# Patient Record
Sex: FEMALE
[De-identification: names, ages, dates, MRNs, and addresses within clinical notes are randomized per-mention and may not be internally consistent; named-entity substitution may affect disease eponyms.]

---

## 2020-10-05 PROBLEM — Z00.00 ENCOUNTER FOR PREVENTIVE HEALTH EXAMINATION: Status: ACTIVE | Noted: 2020-10-05

## 2020-10-07 ENCOUNTER — APPOINTMENT (OUTPATIENT)
Dept: NEUROLOGY | Facility: CLINIC | Age: 30
End: 2020-10-07
Payer: COMMERCIAL

## 2020-10-07 ENCOUNTER — TRANSCRIPTION ENCOUNTER (OUTPATIENT)
Age: 30
End: 2020-10-07

## 2020-10-07 VITALS
SYSTOLIC BLOOD PRESSURE: 108 MMHG | HEART RATE: 82 BPM | WEIGHT: 117 LBS | BODY MASS INDEX: 20.73 KG/M2 | HEIGHT: 63 IN | DIASTOLIC BLOOD PRESSURE: 73 MMHG

## 2020-10-07 VITALS — TEMPERATURE: 97.1 F

## 2020-10-07 DIAGNOSIS — Z87.39 PERSONAL HISTORY OF OTHER DISEASES OF THE MUSCULOSKELETAL SYSTEM AND CONNECTIVE TISSUE: ICD-10-CM

## 2020-10-07 DIAGNOSIS — Z82.0 FAMILY HISTORY OF EPILEPSY AND OTHER DISEASES OF THE NERVOUS SYSTEM: ICD-10-CM

## 2020-10-07 PROCEDURE — 99204 OFFICE O/P NEW MOD 45 MIN: CPT

## 2020-10-07 RX ORDER — GALCANEZUMAB 120 MG/ML
120 INJECTION, SOLUTION SUBCUTANEOUS
Qty: 3 | Refills: 3 | Status: COMPLETED | COMMUNITY

## 2020-10-07 NOTE — ASSESSMENT
[FreeTextEntry1] : Assessment/Plan:\par  30 year female with history of Migraines with aura since 2014. She recently moved to [place], [place] from [place], where she was followed by Dr. Tariq Chávez. She has been on Emgality since Feb 2020 and reports significant improvement in migraine frequency, from 10 migraine days/month to 2 migraines days/month. Patient tolerating the medication well and denies any side effects.\par \par [] Continue Emgality 120 mg monthly subcutaneous injections (will refill)\par Discussed potential adverse reactions; including injections site reactions, Anaphylaxis, angioedema. \par \par \par Headache education provided:\par [] Stay well hydrated\par [] Limit excessive caffeine and alcohol intake\par [] Maintain good sleep hygiene\par [] Try to avoid triggers\par [] Practice good eating habits\par [] Avoid excessive use of over the counter pain medications, as they can cause medication overuse headaches \par [] Keep a headache diary\par \par \par RTC in 3 months\par \par \par MASHA EBER was provided education and counselling on current diagnosis/symptoms, diagnostic work up, treatment options and potential side effects of prescribed therapy/therapies. She was advised to call our clinic at [phone] for any new or worsening symptoms, or with any questions or concerns. MASHA EBER expressed understanding and all her questions were answered.\par

## 2020-10-07 NOTE — PHYSICAL EXAM
[FreeTextEntry1] : PHYSICAL EXAM\par Constitutional: Alert, no acute distress \par Neck: no masses, full range of motion\par Psychiatric: appropriate affect and mood\par Pulmonary: No respiratory distress, stable on room air\par Cardiac: Normal rate/rhythm\par Abdomen: soft, non-distended\par Skin: No rash, no skin lesions\par NEUROLOGICAL EXAM\par Mental status: The patient is alert, attentive, and oriented.\par Speech: clear and fluent with good repetition, comprehension, and naming\par Cranial nerves:\par CN II: Visual fields are full to confrontation. Pupil size equal and briskly reactive to light. \par CN III, IV, VI: EOMI, no nystagmus, no ptosis\par CN V: Facial sensation is intact to pinprick in all 3 divisions bilaterally.\par CN VII: Face is symmetric with normal eye closure and smile.\par CN VII: Hearing is normal to rubbing fingers\par CN IX, X: Palate elevates symmetrically. Phonation is normal.\par CN XI: Head turning and shoulder shrug are intact\par CN XII: Tongue is midline with normal movements and no atrophy.\par Motor: There is no pronator drift of out-stretched arms. Muscle bulk and tone are normal. Strength is full bilaterally. \par 5/5 muscle power at bilat: Deltoid, Biceps, Triceps, Wrist ext, Finger abd, Hip flex, Hip ext, Knee flex, Knee ext, Ankle flex, Ankle ext\par Reflexes: Reflexes are 2+ and symmetric at the biceps, triceps, knees, and ankles. Plantar responses are flexor.\par Sensory: Light touch, pinprick, position sense, and vibration sense are intact in fingers and toes.\par Coordination:\par Rapid alternating movements and fine finger movements are intact. There is no dysmetria on finger-to-nose and heel-knee-shin. There are no abnormal or extraneous movements. Romberg is absent.\par Gait/Stance:\par Posture is normal. Gait is steady with normal steps, base, arm swing, and turning. Heel and toe walking are normal. Tandem gait is normal \par \par \par \par \par

## 2020-10-31 ENCOUNTER — RESULT REVIEW (OUTPATIENT)
Age: 30
End: 2020-10-31

## 2021-01-12 ENCOUNTER — APPOINTMENT (OUTPATIENT)
Dept: NEUROLOGY | Facility: CLINIC | Age: 31
End: 2021-01-12
Payer: COMMERCIAL

## 2021-01-12 PROCEDURE — 99213 OFFICE O/P EST LOW 20 MIN: CPT | Mod: 95

## 2021-01-12 NOTE — REASON FOR VISIT
[Home] : at home, [unfilled] , at the time of the visit. [Other Location: e.g. Home (Enter Location, City,State)___] : at [unfilled] [Follow-Up: _____] : a [unfilled] follow-up visit

## 2021-01-12 NOTE — HISTORY OF PRESENT ILLNESS
[FreeTextEntry1] : Interim Hx 1/12/2021:- She continues to do well. She reports 1 migraine headache since our last visit. She said she had a migraine headache in December, and adds that they had been out driving for most of the day and that likely triggered her headache. She continues on emgality and denies any side effects. She adds that she has had 2 nose bleeds in the last few months and is unsure if this is related to her medication. She reports she has no plans to conceive at this time and will plan to wait another 2 years. \par \par \par HPI (initial visit 10/7/2020):-\par Annmarie has a hx of migraines and is currently on Emgality since Feb 2020. She was following with Dr. Tariq Chávez in [place] (her last injection was September 20th, 2020). She reports significant reduction in migraine frequency since starting the medication. \par \par \par  - - Headache History - - \par Onset (age/year): in 2014, in last semester of college\par Location/radiation: back of the head, mid temporal area, forehead\par Quality: throbbing and pressure\par Duration: 1 day\par Frequency: 1-2 /month. \par                     prior to emgality - 10 migraine days/month\par Aura: visual - floaters (occasionallY)\par            Motor - no\par            Sensory - no\par            Other-x\par Associated symptoms: YES Photophobia, YES Phonophobia, YES nausea, vomiting (yes in the past), NO dizziness, NO autonomic symptoms, YES menstrual cycle (In July 2020 she was switched from a combination to OCP to a progesterone only OCP), NO neck tightness, NO decreased concentration, NO Floaters, NO blurred vision\par Relieving factors: sleep\par Triggers/Aggravating factors: certain smells, lack of sleep, no much sleep\par Caffeine intake: 1 cup of coffee a day\par Current treatment\par                     abortive: none\par                     preventative: no orals, Emgality\par Medications tried/failed: sumatriptan, Rizatriptan ("not effective")\par Imaging studies: MRI brain in [place] (a few years ago- ~ 2015)- patient believes it was normal\par TMJ: no issues\par

## 2021-01-12 NOTE — PHYSICAL EXAM
[FreeTextEntry1] : PHYSICAL EXAM\par Constitutional: Alert, no acute distress \par Neck: full range of motion\par Psychiatric: appropriate affect and mood\par Pulmonary: No respiratory distress, stable on room air\par \par NEUROLOGICAL EXAM\par Mental status: The patient is alert, attentive, and oriented.\par Speech: clear and fluent \par Cranial nerves:\par CN III, IV, VI: EOMI, no nystagmus, no ptosis\par CN VII: Face is symmetric with normal eye closure and smile.\par CN XII: Tongue is midline with normal movements and no atrophy.\par Motor: There is no pronator drift of out-stretched arms.\par Coordination:\par Rapid alternating movements and fine finger movements are intact. There is no dysmetria on finger-to-nose. There are no abnormal or extraneous movements. \par Gait/Stance: not assessed\par \par

## 2021-01-12 NOTE — ASSESSMENT
[FreeTextEntry1] : Assessment/Plan:\par  30 year female with history of Migraines with aura since  and currently well controlled on Emgality. She has been on Emgality since 2020 and reports significant improvement in migraine frequency, from 10 migraine days/month to 1-2 migraines days/month. Patient tolerating the medication well and denies any side effects.\par \par [] Continue Emgality 120 mg monthly subcutaneous injections (will refill)\par Discussed potential adverse reactions; including injections site reactions, Anaphylaxis, angioedema. \par Will continue to monitor nose bleeds and if she continues to have these symptoms will discuss with drug company.\par Masha reports no plans to conceive at this time. I have discussed with her that it is not recommended to be on Emgality during pregnancy or with plans to conceive and would recommend at least a 5 month wash out period prior to becoming pregnant.\par \par [] Continue Ubrelvy as needed for migraine  (pt reports not having to take this medication)\par \par \par Headache education provided:\par [] Stay well hydrated\par [] Limit excessive caffeine and alcohol intake\par [] Maintain good sleep hygiene\par [] Try to avoid triggers\par [] Practice good eating habits\par [] Avoid excessive use of over the counter pain medications, as they can cause medication overuse headaches \par [] Keep a headache diary\par \par \par RTC in 6 months, or sooner if needed\par \par \par MASHA VELÁZQUEZ was provided education and counselling on current diagnosis/symptoms, diagnostic work up, treatment options and potential side effects of prescribed therapy/therapies. She was advised to call our clinic at [phone] for any new or worsening symptoms, or with any questions or concerns. MASHA VELÁZQUEZ expressed understanding and all her questions were answered.\par

## 2021-02-06 ENCOUNTER — OUTPATIENT (OUTPATIENT)
Dept: OUTPATIENT SERVICES | Facility: HOSPITAL | Age: 31
LOS: 1 days | End: 2021-02-06
Payer: COMMERCIAL

## 2021-02-06 DIAGNOSIS — Z20.828 CONTACT WITH AND (SUSPECTED) EXPOSURE TO OTHER VIRAL COMMUNICABLE DISEASES: ICD-10-CM

## 2021-02-06 LAB — SARS-COV-2 RNA SPEC QL NAA+PROBE: SIGNIFICANT CHANGE UP

## 2021-02-06 PROCEDURE — U0003: CPT

## 2021-02-06 PROCEDURE — C9803: CPT

## 2021-02-06 PROCEDURE — U0005: CPT

## 2021-02-07 DIAGNOSIS — Z20.828 CONTACT WITH AND (SUSPECTED) EXPOSURE TO OTHER VIRAL COMMUNICABLE DISEASES: ICD-10-CM

## 2021-03-23 ENCOUNTER — TRANSCRIPTION ENCOUNTER (OUTPATIENT)
Age: 31
End: 2021-03-23

## 2021-04-02 ENCOUNTER — TRANSCRIPTION ENCOUNTER (OUTPATIENT)
Age: 31
End: 2021-04-02

## 2021-04-02 ENCOUNTER — NON-APPOINTMENT (OUTPATIENT)
Age: 31
End: 2021-04-02

## 2021-04-29 ENCOUNTER — TRANSCRIPTION ENCOUNTER (OUTPATIENT)
Age: 31
End: 2021-04-29

## 2021-05-05 ENCOUNTER — TRANSCRIPTION ENCOUNTER (OUTPATIENT)
Age: 31
End: 2021-05-05

## 2021-05-11 ENCOUNTER — APPOINTMENT (OUTPATIENT)
Dept: NEUROLOGY | Facility: CLINIC | Age: 31
End: 2021-05-11

## 2021-05-11 ENCOUNTER — NON-APPOINTMENT (OUTPATIENT)
Age: 31
End: 2021-05-11

## 2021-05-19 ENCOUNTER — TRANSCRIPTION ENCOUNTER (OUTPATIENT)
Age: 31
End: 2021-05-19

## 2021-09-15 ENCOUNTER — RX RENEWAL (OUTPATIENT)
Age: 31
End: 2021-09-15

## 2021-09-16 ENCOUNTER — TRANSCRIPTION ENCOUNTER (OUTPATIENT)
Age: 31
End: 2021-09-16

## 2021-12-06 ENCOUNTER — TRANSCRIPTION ENCOUNTER (OUTPATIENT)
Age: 31
End: 2021-12-06

## 2021-12-16 ENCOUNTER — TRANSCRIPTION ENCOUNTER (OUTPATIENT)
Age: 31
End: 2021-12-16

## 2022-02-16 ENCOUNTER — TRANSCRIPTION ENCOUNTER (OUTPATIENT)
Age: 32
End: 2022-02-16

## 2022-02-24 ENCOUNTER — TRANSCRIPTION ENCOUNTER (OUTPATIENT)
Age: 32
End: 2022-02-24

## 2022-03-01 ENCOUNTER — APPOINTMENT (OUTPATIENT)
Dept: NEUROLOGY | Facility: CLINIC | Age: 32
End: 2022-03-01
Payer: COMMERCIAL

## 2022-03-01 DIAGNOSIS — L65.9 NONSCARRING HAIR LOSS, UNSPECIFIED: ICD-10-CM

## 2022-03-01 PROCEDURE — 99213 OFFICE O/P EST LOW 20 MIN: CPT | Mod: 95

## 2022-03-01 RX ORDER — PROMETHAZINE HYDROCHLORIDE 25 MG/1
25 TABLET ORAL EVERY 8 HOURS
Qty: 15 | Refills: 3 | Status: DISCONTINUED | COMMUNITY
Start: 2021-05-11 | End: 2022-03-01

## 2022-03-01 RX ORDER — METHYLPREDNISOLONE 4 MG/1
4 TABLET ORAL
Qty: 1 | Refills: 0 | Status: DISCONTINUED | COMMUNITY
Start: 2021-04-02 | End: 2022-03-01

## 2022-03-01 NOTE — HISTORY OF PRESENT ILLNESS
[FreeTextEntry1] : INTERIM HX 03/01/2022: She continues on emgality. She got COVID in Dec 2021. Taste not fully back. Headaches got worse during COVID. Freq: 1/month, not as severe. She has not gotten the booster yet. Noticing hair loss, recently. \par \par Interim Hx 1/12/2021:- She continues to do well. She reports 1 migraine headache since our last visit. She said she had a migraine headache in December, and adds that they had been out driving for most of the day and that likely triggered her headache. She continues on emgality and denies any side effects. She adds that she has had 2 nose bleeds in the last few months and is unsure if this is related to her medication. She reports she has no plans to conceive at this time and will plan to wait another 2 years. \par \par \par HPI (initial visit 10/7/2020):-\par Annmarie has a hx of migraines and is currently on Emgality since Feb 2020. She was following with Dr. Tariq Chávez in [place] (her last injection was September 20th, 2020). She reports significant reduction in migraine frequency since starting the medication. \par \par \par  - - Headache History - - \par Onset (age/year): in 2014, in last semester of college\par Location/radiation: back of the head, mid temporal area, forehead\par Quality: throbbing and pressure\par Duration: 1 day\par Frequency: 1-2 /month. \par                     prior to emgality - 10 migraine days/month\par Aura: visual - floaters (occasionallY)\par            Motor - no\par            Sensory - no\par            Other-x\par Associated symptoms: YES Photophobia, YES Phonophobia, YES nausea, vomiting (yes in the past), NO dizziness, NO autonomic symptoms, YES menstrual cycle (In July 2020 she was switched from a combination to OCP to a progesterone only OCP), NO neck tightness, NO decreased concentration, NO Floaters, NO blurred vision\par Relieving factors: sleep\par Triggers/Aggravating factors: certain smells, lack of sleep, no much sleep\par Caffeine intake: 1 cup of coffee a day\par Current treatment\par                     abortive: none\par                     preventative: no orals, Emgality\par Medications tried/failed: sumatriptan, Rizatriptan ("not effective")\par Imaging studies: MRI brain in [place] (a few years ago- ~ 2015)- patient believes it was normal\par TMJ: no issues\par

## 2022-03-01 NOTE — ASSESSMENT
[FreeTextEntry1] : Assessment/Plan:\par 1. Migraines with aura-  currently well controlled on Emgality. She has been on Emgality since 2020 and reports significant improvement in migraine frequency, from 10 migraine days/month to 1-2 migraines days/month. Patient tolerating the medication well and denies any side effects.\par Plan:-\par [] Continue Emgality 120 mg monthly subcutaneous injections (will refill)\par Discussed potential adverse reactions; including injections site reactions, Anaphylaxis, angioedema. \par Masha reports no plans to conceive at this time. I have discussed with her that it is not recommended to be on Emgality during pregnancy or with plans to conceive and would recommend at least a 5 month wash out period prior to becoming pregnant.\par [] Continue Ubrelvy as needed for migraine  (pt reports not having to take this medication)\par \par \par Headache education provided:\par [] Stay well hydrated\par [] Limit excessive caffeine and alcohol intake\par [] Maintain good sleep hygiene\par [] Try to avoid triggers\par [] Practice good eating habits\par [] Avoid excessive use of over the counter pain medications, as they can cause medication overuse headaches \par [] Keep a headache diary\par \par 2. Hair loss- this is likely multifactorial. Likely related hair fall with age that is exacerbated by recent COVID-19 infection. Recommend multivitamin supplementation. \par \par \par RTC in 6 months, or sooner if needed\par \par \par The above plan was discussed with MASHA VELÁZQUEZ in great detail.  MASHA VELÁZQUEZ verbalized understanding and agrees with plan as detailed above. Patient was provided education and counselling on current diagnosis/symptoms, diagnostic work up, treatment options and potential side effects of any prescribed therapy/therapies. She was advised to call our clinic at [phone] for any new or worsening symptoms, or with any questions or concerns. In case of acute onset of neurological symptoms or worsening presentation, patient was advised to present to nearest emergency room for further evaluation. MASHA VELÁZQUEZ expressed understanding and all her questions/concerns were addressed.\par \par Karen Vega M.D\par

## 2022-03-01 NOTE — PHYSICAL EXAM
[FreeTextEntry1] : sitting in her [employer] car. Speech is clear and fluent. Comprehension intact. NO facial asymmetry noted. No definite ptosis. In good mood.

## 2022-03-01 NOTE — REASON FOR VISIT
[Follow-Up: _____] : a [unfilled] follow-up visit [Medical Office: ([facility])___] : at the medical office located in  [Verbal consent obtained from patient] : the patient, [unfilled] [Other Location: e.g. School (Enter Location, City,State)___] : at [unfilled], at the time of the visit.

## 2022-08-29 ENCOUNTER — APPOINTMENT (OUTPATIENT)
Dept: NEUROLOGY | Facility: CLINIC | Age: 32
End: 2022-08-29

## 2022-08-29 PROCEDURE — 99212 OFFICE O/P EST SF 10 MIN: CPT | Mod: 95

## 2022-08-29 RX ORDER — CLOTRIMAZOLE AND BETAMETHASONE DIPROPIONATE 10; .5 MG/G; MG/G
1-0.05 CREAM TOPICAL
Qty: 30 | Refills: 0 | Status: DISCONTINUED | COMMUNITY
Start: 2022-04-28

## 2022-08-29 NOTE — HISTORY OF PRESENT ILLNESS
[FreeTextEntry1] : INTERIM HX 08/29/2022: Doing very well. No migraine since since the fall. On Emgality. No plans to conceive yet. \par \par INTERIM HX 03/01/2022: She continues on emgality. She got COVID in Dec 2021.  Taste not fully back. Headaches got worse during COVID. Freq: 1/month, not as severe. She has not gotten the booster yet. Noticing hair loss, recently. \par \par \par Interim Hx 1/12/2021:- She continues to do well. She reports 1 migraine headache since our last visit. She said she had a migraine headache in December, and adds that they had been out driving for most of the day and that likely triggered her headache. She continues on emgality and denies any side effects. She adds that she has had 2 nose bleeds in the last few months and is unsure if this is related to her medication. She reports she has no plans to conceive at this time and will plan to wait another 2 years. \par \par \par HPI (initial visit 10/7/2020):-\par Annmarie has a hx of migraines and is currently on Emgality since Feb 2020. She was following with Dr. Tariq Chávez in [place] (her last injection was September 20th, 2020). She reports significant reduction in migraine frequency since starting the medication. \par \par \par  - - Headache History - - \par Onset (age/year): in 2014, in last semester of college\par Location/radiation: back of the head, mid temporal area, forehead\par Quality: throbbing and pressure\par Duration: 1 day\par Frequency: 1-2 /month. \par                     prior to emgality - 10 migraine days/month\par Aura: visual - floaters (occasionallY)\par            Motor - no\par            Sensory - no\par            Other-x\par Associated symptoms: YES Photophobia, YES Phonophobia, YES nausea, vomiting (yes in the past), NO dizziness, NO autonomic symptoms, YES menstrual cycle (In July 2020 she was switched from a combination to OCP to a progesterone only OCP), NO neck tightness, NO decreased concentration, NO Floaters, NO blurred vision\par Relieving factors: sleep\par Triggers/Aggravating factors: certain smells, lack of sleep, no much sleep\par Caffeine intake: 1 cup of coffee a day\par Current treatment\par                     abortive: none\par                     preventative: no orals, Emgality\par Medications tried/failed: sumatriptan, Rizatriptan ("not effective")\par Imaging studies: MRI brain in [place] (a few years ago- ~ 2015)- patient believes it was normal\par TMJ: no issues\par

## 2022-08-29 NOTE — PHYSICAL EXAM
[FreeTextEntry1] : Sitting in her desk (at home). Speech is clear and fluent. Comprehension intact. No facial asymmetry noted. No definite ptosis. In good mood.

## 2022-08-29 NOTE — REASON FOR VISIT
[Home] : at home, [unfilled] , at the time of the visit. [Medical Office: ([facility])___] : at the medical office located in  [Patient] : the patient [Follow-Up: _____] : a [unfilled] follow-up visit

## 2022-08-29 NOTE — ASSESSMENT
[FreeTextEntry1] : Assessment/Plan:\par Migraines with aura-  currently well controlled on Emgality. She has been on Emgality since Feb 2020 and reports significant improvement in migraine frequency, from 10 migraine days/month to now experiencing sporadic headaches. Patient tolerating the medication well and denies any side effects.\par \par Plan:-\par [] Continue Emgality 120 mg monthly subcutaneous injections (will refill)\par Discussed potential adverse reactions; including injections site reactions, Anaphylaxis, angioedema. \par Masha reports no plans to conceive at this time. I have discussed with her that it is not recommended to be on Emgality during pregnancy or with plans to conceive and would recommend at least a 5 month wash out period prior to becoming pregnant.\par \par \par Headache education provided:\par [] Stay well hydrated\par [] Limit excessive caffeine and alcohol intake\par [] Maintain good sleep hygiene. Follow a consistent sleep and wake schedule. \par [] Practice good eating habits. Avoid skipping meals. \par [] Try to avoid any known triggers\par [] Avoid excessive use of over the counter pain medications, as they can cause medication overuse headaches \par [] Keep a headache diary\par [] Relaxation techniques, biofeedback, massage therapy, acupunctures, and heating pads may be effective\par \par \par RTC in 6 months, or sooner if needed\par \par The above plan was discussed with MASHA EBER in great detail.  MASHA CRUZZVI verbalized understanding and agrees with plan as detailed above. She was advised to call our clinic at [phone] for any new or worsening symptoms, or with any questions or concerns. In case of acute onset of neurological symptoms or worsening presentation, patient was advised to present to nearest emergency room for further evaluation. MASHA EBER expressed understanding and all her questions/concerns were addressed.\par \par Karen Vega M.D\par

## 2022-11-30 ENCOUNTER — TRANSCRIPTION ENCOUNTER (OUTPATIENT)
Age: 32
End: 2022-11-30

## 2022-12-28 ENCOUNTER — TRANSCRIPTION ENCOUNTER (OUTPATIENT)
Age: 32
End: 2022-12-28

## 2023-02-01 ENCOUNTER — APPOINTMENT (OUTPATIENT)
Dept: NEUROLOGY | Facility: CLINIC | Age: 33
End: 2023-02-01

## 2023-02-03 ENCOUNTER — APPOINTMENT (OUTPATIENT)
Dept: NEUROLOGY | Facility: CLINIC | Age: 33
End: 2023-02-03
Payer: COMMERCIAL

## 2023-02-03 VITALS
DIASTOLIC BLOOD PRESSURE: 68 MMHG | BODY MASS INDEX: 19.67 KG/M2 | WEIGHT: 111 LBS | HEIGHT: 63 IN | SYSTOLIC BLOOD PRESSURE: 109 MMHG | HEART RATE: 79 BPM

## 2023-02-03 DIAGNOSIS — G43.109 MIGRAINE WITH AURA, NOT INTRACTABLE, W/OUT STATUS MIGRAINOSUS: ICD-10-CM

## 2023-02-03 PROCEDURE — 99214 OFFICE O/P EST MOD 30 MIN: CPT

## 2023-02-03 RX ORDER — NORETHINDRONE 0.35 MG/1
0.35 TABLET ORAL
Qty: 28 | Refills: 0 | Status: ACTIVE | COMMUNITY
Start: 2021-01-05

## 2023-02-03 RX ORDER — GALCANEZUMAB 120 MG/ML
120 INJECTION, SOLUTION SUBCUTANEOUS
Qty: 1 | Refills: 4 | Status: DISCONTINUED | COMMUNITY
Start: 2020-10-07 | End: 2023-02-03

## 2023-02-03 NOTE — PHYSICAL EXAM
[FreeTextEntry1] : no dysarthria\par no facial asymmetry\par PERRL, EOMI, VFC intact\par Motor strength 5/5 UE and LE\par Gait is normal

## 2023-02-03 NOTE — DATA REVIEWED
[de-identified] : MRI brain in [place] (a few years ago- ~ 2015)- patient believes it was normal

## 2023-02-03 NOTE — HISTORY OF PRESENT ILLNESS
[FreeTextEntry1] : INTERIM HX 02/03/2023: Doing well. Does remember her last migraine. Planning to start a family. Last shot Jan 20th. \par \par INTERIM HX 08/29/2022: Doing very well. No migraine since since the fall. On Emgality. No plans to conceive yet. \par \par INTERIM HX 03/01/2022: She continues on emgality. She got COVID in Dec 2021.  Taste not fully back. Headaches got worse during COVID. Freq: 1/month, not as severe. She has not gotten the booster yet. Noticing hair loss, recently. \par \par Interim Hx 1/12/2021:- She continues to do well. She reports 1 migraine headache since our last visit. She said she had a migraine headache in December, and adds that they had been out driving for most of the day and that likely triggered her headache. She continues on emgality and denies any side effects. She adds that she has had 2 nose bleeds in the last few months and is unsure if this is related to her medication. She reports she has no plans to conceive at this time and will plan to wait another 2 years. \par --------------------------------------------------------------\par HPI (initial visit 10/7/2020):-\par Annmarie has a hx of migraines and is currently on Emgality since Feb 2020. She was following with Dr. Tariq Chávez in [place] (her last injection was September 20th, 2020). She reports significant reduction in migraine frequency since starting the medication. \par \par \par  - - Headache History - - \par Onset (age/year): in 2014, in last semester of college\par Location/radiation: back of the head, mid temporal area, forehead\par Quality: throbbing and pressure\par Duration: 1 day\par Frequency: 1-2 /month. \par                     prior to emgality - 10 migraine days/month\par Aura: visual - floaters (occasionallY)\par            Motor - no\par            Sensory - no\par            Other-x\par Associated symptoms: YES Photophobia, YES Phonophobia, YES nausea, vomiting (yes in the past), NO dizziness, NO autonomic symptoms, YES menstrual cycle (In July 2020 she was switched from a combination to OCP to a progesterone only OCP), NO neck tightness, NO decreased concentration, NO Floaters, NO blurred vision\par Relieving factors: sleep\par Triggers/Aggravating factors: certain smells, lack of sleep, no much sleep\par Caffeine intake: 1 cup of coffee a day\par Current treatment\par                     abortive: none\par                     preventative: no orals, Emgality\par Medications tried/failed: sumatriptan, Rizatriptan ("not effective")\par Imaging studies: MRI brain in [place] (a few years ago- ~ 2015)- patient believes it was normal\par TMJ: no issues\par

## 2023-02-07 RX ORDER — RIMEGEPANT SULFATE 75 MG/75MG
75 TABLET, ORALLY DISINTEGRATING ORAL
Qty: 1 | Refills: 6 | Status: ACTIVE | COMMUNITY
Start: 2023-02-03 | End: 1900-01-01

## 2023-02-22 ENCOUNTER — TRANSCRIPTION ENCOUNTER (OUTPATIENT)
Age: 33
End: 2023-02-22

## 2023-02-23 ENCOUNTER — NON-APPOINTMENT (OUTPATIENT)
Age: 33
End: 2023-02-23

## 2023-02-23 ENCOUNTER — TRANSCRIPTION ENCOUNTER (OUTPATIENT)
Age: 33
End: 2023-02-23

## 2023-03-21 ENCOUNTER — TRANSCRIPTION ENCOUNTER (OUTPATIENT)
Age: 33
End: 2023-03-21

## 2023-04-20 ENCOUNTER — TRANSCRIPTION ENCOUNTER (OUTPATIENT)
Age: 33
End: 2023-04-20